# Patient Record
Sex: MALE | Race: WHITE
[De-identification: names, ages, dates, MRNs, and addresses within clinical notes are randomized per-mention and may not be internally consistent; named-entity substitution may affect disease eponyms.]

---

## 2019-12-11 PROBLEM — Z00.00 ENCOUNTER FOR PREVENTIVE HEALTH EXAMINATION: Status: ACTIVE | Noted: 2019-12-11

## 2020-04-02 ENCOUNTER — RESULT REVIEW (OUTPATIENT)
Age: 65
End: 2020-04-02

## 2020-04-21 ENCOUNTER — RESULT REVIEW (OUTPATIENT)
Age: 65
End: 2020-04-21

## 2020-04-26 ENCOUNTER — MESSAGE (OUTPATIENT)
Age: 65
End: 2020-04-26

## 2020-05-22 LAB
SARS-COV-2 IGG SERPL IA-ACNC: 0 INDEX
SARS-COV-2 IGG SERPL QL IA: NEGATIVE

## 2021-01-22 ENCOUNTER — RESULT REVIEW (OUTPATIENT)
Age: 66
End: 2021-01-22

## 2021-12-08 ENCOUNTER — APPOINTMENT (OUTPATIENT)
Dept: GASTROENTEROLOGY | Facility: HOSPITAL | Age: 66
End: 2021-12-08

## 2022-03-07 ENCOUNTER — APPOINTMENT (OUTPATIENT)
Dept: GASTROENTEROLOGY | Facility: CLINIC | Age: 67
End: 2022-03-07

## 2022-04-01 ENCOUNTER — APPOINTMENT (OUTPATIENT)
Dept: GASTROENTEROLOGY | Facility: CLINIC | Age: 67
End: 2022-04-01

## 2022-04-01 NOTE — HISTORY OF PRESENT ILLNESS
[Home] : at home, [unfilled] , at the time of the visit. [Medical Office: ([facility])___] : at the medical office located in  [Verbal consent obtained from patient] : the patient, [unfilled] [de-identified] : -  Colonoscopy 10/2018 and  9/2012 ( screening): Hemorrhoids / diverticulosis

## 2022-04-06 ENCOUNTER — APPOINTMENT (OUTPATIENT)
Dept: GASTROENTEROLOGY | Facility: CLINIC | Age: 67
End: 2022-04-06

## 2022-04-13 ENCOUNTER — APPOINTMENT (OUTPATIENT)
Dept: GASTROENTEROLOGY | Facility: CLINIC | Age: 67
End: 2022-04-13

## 2023-08-09 ENCOUNTER — APPOINTMENT (OUTPATIENT)
Dept: NEUROLOGY | Facility: CLINIC | Age: 68
End: 2023-08-09
Payer: MEDICARE

## 2023-08-09 VITALS
BODY MASS INDEX: 24.01 KG/M2 | HEIGHT: 67 IN | WEIGHT: 153 LBS | OXYGEN SATURATION: 97 % | DIASTOLIC BLOOD PRESSURE: 82 MMHG | SYSTOLIC BLOOD PRESSURE: 137 MMHG | HEART RATE: 75 BPM

## 2023-08-09 DIAGNOSIS — G31.84 MILD COGNITIVE IMPAIRMENT, SO STATED: ICD-10-CM

## 2023-08-09 DIAGNOSIS — R56.9 UNSPECIFIED CONVULSIONS: ICD-10-CM

## 2023-08-09 PROCEDURE — 99215 OFFICE O/P EST HI 40 MIN: CPT

## 2023-08-10 PROBLEM — R56.9 GENERALIZED SEIZURES: Status: ACTIVE | Noted: 2023-08-10

## 2023-08-10 PROBLEM — G31.84 MCI (MILD COGNITIVE IMPAIRMENT) WITH MEMORY LOSS: Status: ACTIVE | Noted: 2023-08-10

## 2023-08-10 NOTE — ASSESSMENT
[FreeTextEntry1] : Thyroid function test Folate and vitamin B12 testing He will need an  MRIHead MRI noncontrast Routine EEG Neuropsychological assessment

## 2023-08-10 NOTE — HISTORY OF PRESENT ILLNESS
[FreeTextEntry1] : First visit of this 68-year-old right-handed male who was brought by his wife for neurologic consultation.  The patient was in the emergency room at [facility] yesterday after he had an event on 8/8/2023  Past medical history significant for hypertension.  The patient is on aspirin 81 mg a day folic acid and lisinopril.  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 was well until 8/8/2023.  He was asleep at around 2 AM in the morning his wife noticed that he had a generalized tonic-clonic seizure.  It lasted 2 minutes or so.  He was postictal for approximately 15 minutes.  He was taken to a local emergency room where he had a basic workup including blood, CT of the brain, EKG etc.  Labs were basically normal.  CT of the head showed no abnormality.  EKG was also within normal range.  The patient reports no warning and no recollection of the events.  In terms of potential etiologies the patient has been taking Ambien 10 mg nightly for 20 years.  He ran out of the medication 5 days before August 8 and had been off the medication for 5 days.  The patient also has a significant history of alcohol use.  He drinks 3-4 drinks a day.  Separate from the above the wife reports that he has been having issues with his memory.  For approximately 2 years he has not been able to drive because he gets confused and lost.  The wife reports that  his memory is not normal. In terms of problems with his memory he does not really do much anymore.  He is at home most of the time.  When I asked him about what he had yesterday or today he did not seem to be able to tell me.

## 2023-08-10 NOTE — PHYSICAL EXAM
[Person] : oriented to person [Place] : oriented to place [Time] : disoriented to time [Span Intact] : the attention span was decreased [Naming Objects] : difficulty naming common objects [Fluency] : fluency intact [Comprehension] : comprehension intact [Current Events] : inadequate knowledge of current events [Cranial Nerves Optic (II)] : visual acuity intact bilaterally,  visual fields full to confrontation, pupils equal round and reactive to light [Cranial Nerves Oculomotor (III)] : extraocular motion intact [Cranial Nerves Trigeminal (V)] : facial sensation intact symmetrically [Cranial Nerves Facial (VII)] : face symmetrical [Cranial Nerves Vestibulocochlear (VIII)] : hearing was intact bilaterally [Cranial Nerves Accessory (XI - Cranial And Spinal)] : head turning and shoulder shrug symmetric [Motor Tone] : muscle tone was normal in all four extremities [Motor Handedness Right-Handed] : the patient is right hand dominant [Abnormal Walk] : normal gait [2+] : Brachioradialis left 2+ [FreeTextEntry4] : Adah 16/30

## 2023-08-11 ENCOUNTER — RESULT REVIEW (OUTPATIENT)
Age: 68
End: 2023-08-11

## 2023-08-28 LAB
FOLATE SERPL-MCNC: 15.8 NG/ML
VIT B12 SERPL-MCNC: 1263 PG/ML

## 2023-09-15 ENCOUNTER — NON-APPOINTMENT (OUTPATIENT)
Age: 68
End: 2023-09-15

## 2023-09-15 ENCOUNTER — APPOINTMENT (OUTPATIENT)
Dept: NEUROLOGY | Facility: CLINIC | Age: 68
End: 2023-09-15
Payer: MEDICARE

## 2023-09-15 PROCEDURE — 95819 EEG AWAKE AND ASLEEP: CPT

## 2023-10-06 ENCOUNTER — RESULT REVIEW (OUTPATIENT)
Age: 68
End: 2023-10-06

## 2023-10-11 ENCOUNTER — APPOINTMENT (OUTPATIENT)
Dept: VASCULAR SURGERY | Facility: CLINIC | Age: 68
End: 2023-10-11

## 2023-10-16 ENCOUNTER — APPOINTMENT (OUTPATIENT)
Dept: NEUROSURGERY | Facility: CLINIC | Age: 68
End: 2023-10-16
Payer: MEDICARE

## 2023-10-16 VITALS
SYSTOLIC BLOOD PRESSURE: 140 MMHG | DIASTOLIC BLOOD PRESSURE: 82 MMHG | WEIGHT: 133 LBS | HEART RATE: 113 BPM | HEIGHT: 67 IN | BODY MASS INDEX: 20.88 KG/M2

## 2023-10-16 PROCEDURE — 99205 OFFICE O/P NEW HI 60 MIN: CPT

## 2023-10-19 ENCOUNTER — RESULT REVIEW (OUTPATIENT)
Age: 68
End: 2023-10-19

## 2023-10-25 ENCOUNTER — APPOINTMENT (OUTPATIENT)
Dept: CARDIOLOGY | Facility: CLINIC | Age: 68
End: 2023-10-25
Payer: MEDICARE

## 2023-10-25 VITALS
OXYGEN SATURATION: 98 % | BODY MASS INDEX: 21.19 KG/M2 | WEIGHT: 135 LBS | RESPIRATION RATE: 18 BRPM | HEART RATE: 98 BPM | TEMPERATURE: 97.6 F | DIASTOLIC BLOOD PRESSURE: 80 MMHG | HEIGHT: 67 IN | SYSTOLIC BLOOD PRESSURE: 150 MMHG

## 2023-10-25 DIAGNOSIS — I10 ESSENTIAL (PRIMARY) HYPERTENSION: ICD-10-CM

## 2023-10-25 DIAGNOSIS — Z78.9 OTHER SPECIFIED HEALTH STATUS: ICD-10-CM

## 2023-10-25 DIAGNOSIS — Z01.810 ENCOUNTER FOR PREPROCEDURAL CARDIOVASCULAR EXAMINATION: ICD-10-CM

## 2023-10-25 DIAGNOSIS — R91.1 SOLITARY PULMONARY NODULE: ICD-10-CM

## 2023-10-25 DIAGNOSIS — Z87.19 PERSONAL HISTORY OF OTHER DISEASES OF THE DIGESTIVE SYSTEM: ICD-10-CM

## 2023-10-25 DIAGNOSIS — Z87.891 PERSONAL HISTORY OF NICOTINE DEPENDENCE: ICD-10-CM

## 2023-10-25 PROCEDURE — 99204 OFFICE O/P NEW MOD 45 MIN: CPT

## 2023-10-25 RX ORDER — ZOLPIDEM TARTRATE 10 MG/1
10 TABLET, FILM COATED ORAL
Refills: 0 | Status: ACTIVE | COMMUNITY

## 2023-10-25 RX ORDER — LISINOPRIL 10 MG/1
10 TABLET ORAL DAILY
Qty: 90 | Refills: 3 | Status: ACTIVE | COMMUNITY

## 2023-10-25 RX ORDER — ASPIRIN 81 MG
81 TABLET, DELAYED RELEASE (ENTERIC COATED) ORAL DAILY
Refills: 0 | Status: ACTIVE | COMMUNITY

## 2023-10-31 ENCOUNTER — NON-APPOINTMENT (OUTPATIENT)
Age: 68
End: 2023-10-31

## 2023-11-16 ENCOUNTER — APPOINTMENT (OUTPATIENT)
Dept: NEUROSURGERY | Facility: HOSPITAL | Age: 68
End: 2023-11-16

## 2023-11-16 ENCOUNTER — RESULT REVIEW (OUTPATIENT)
Age: 68
End: 2023-11-16

## 2023-11-17 ENCOUNTER — TRANSCRIPTION ENCOUNTER (OUTPATIENT)
Age: 68
End: 2023-11-17

## 2023-11-29 ENCOUNTER — APPOINTMENT (OUTPATIENT)
Dept: NEUROSURGERY | Facility: CLINIC | Age: 68
End: 2023-11-29
Payer: MEDICARE

## 2023-11-29 VITALS
OXYGEN SATURATION: 96 % | HEIGHT: 67 IN | HEART RATE: 95 BPM | WEIGHT: 135 LBS | SYSTOLIC BLOOD PRESSURE: 120 MMHG | BODY MASS INDEX: 21.19 KG/M2 | DIASTOLIC BLOOD PRESSURE: 80 MMHG

## 2023-11-29 PROCEDURE — 99024 POSTOP FOLLOW-UP VISIT: CPT

## 2023-12-12 ENCOUNTER — APPOINTMENT (OUTPATIENT)
Dept: PAIN MANAGEMENT | Facility: CLINIC | Age: 68
End: 2023-12-12
Payer: MEDICARE

## 2023-12-12 VITALS
OXYGEN SATURATION: 99 % | WEIGHT: 135 LBS | SYSTOLIC BLOOD PRESSURE: 119 MMHG | HEIGHT: 67 IN | BODY MASS INDEX: 21.19 KG/M2 | DIASTOLIC BLOOD PRESSURE: 75 MMHG | HEART RATE: 79 BPM

## 2023-12-12 DIAGNOSIS — M96.1 POSTLAMINECTOMY SYNDROME, NOT ELSEWHERE CLASSIFIED: ICD-10-CM

## 2023-12-12 DIAGNOSIS — M54.16 RADICULOPATHY, LUMBAR REGION: ICD-10-CM

## 2023-12-12 PROCEDURE — 99203 OFFICE O/P NEW LOW 30 MIN: CPT

## 2023-12-22 ENCOUNTER — APPOINTMENT (OUTPATIENT)
Dept: PULMONOLOGY | Facility: CLINIC | Age: 68
End: 2023-12-22
Payer: MEDICARE

## 2023-12-22 VITALS
HEART RATE: 85 BPM | DIASTOLIC BLOOD PRESSURE: 70 MMHG | BODY MASS INDEX: 21.19 KG/M2 | WEIGHT: 135 LBS | HEIGHT: 67 IN | SYSTOLIC BLOOD PRESSURE: 120 MMHG | OXYGEN SATURATION: 98 %

## 2023-12-22 DIAGNOSIS — R91.1 SOLITARY PULMONARY NODULE: ICD-10-CM

## 2023-12-22 PROCEDURE — 99204 OFFICE O/P NEW MOD 45 MIN: CPT

## 2023-12-22 NOTE — ASSESSMENT
[FreeTextEntry1] : Patient with 8.3 mm left lower lobe nodule.  There are no old films to compare with.  Patient will be referred for a CAT scan of the chest after January 6 which will represent a 3-month follow-up.  Note is made that the patient has lost 30 pounds over the past 6 months.  He does have a history of alcoholic cirrhosis.  I will discuss with patient after the studies performed.

## 2023-12-22 NOTE — HISTORY OF PRESENT ILLNESS
[TextBox_4] : 68-year-old male former less than 1 pack/day smoker but quit 30 years ago.  He recently was undergoing evaluation for leg pain and underwent a CTA of the abdomen.  This reveals an 8.3 mm left lower lobe pulmonary nodule.  There are no old films to compare with his chest x-ray is normal.  He denies chest pain shortness of breath cough or wheezing.  Past medical history significant for hypertension, alcoholic cirrhosis, right sciatica with numbness of the toes bilaterally.  He is status post lumbar fusion on November 16.  Of note the patient has lost 30 pounds over the past 6 months of unclear reason.  He simply has no appetite.  He is a former alcohol abuser.  In November.  He now comes in for evaluation of the 8.3 mm left lower lobe nodule.

## 2023-12-26 LAB
AFP-TM SERPL-MCNC: 2.1 NG/ML
ALBUMIN SERPL ELPH-MCNC: 4.6 G/DL
ALP BLD-CCNC: 102 U/L
ALT SERPL-CCNC: 41 U/L
ANA PAT FLD IF-IMP: ABNORMAL
ANA SER IF-ACNC: ABNORMAL
ANION GAP SERPL CALC-SCNC: 13 MMOL/L
AST SERPL-CCNC: 34 U/L
BASOPHILS # BLD AUTO: 0.08 K/UL
BASOPHILS NFR BLD AUTO: 0.8 %
BILIRUB DIRECT SERPL-MCNC: 0.2 MG/DL
BILIRUB INDIRECT SERPL-MCNC: 0.2 MG/DL
BILIRUB SERPL-MCNC: 0.4 MG/DL
BUN SERPL-MCNC: 10 MG/DL
CALCIUM SERPL-MCNC: 9.8 MG/DL
CERULOPLASMIN SERPL-MCNC: 27 MG/DL
CHLORIDE SERPL-SCNC: 102 MMOL/L
CO2 SERPL-SCNC: 25 MMOL/L
CREAT SERPL-MCNC: 0.8 MG/DL
EGFR: 98 ML/MIN/1.73M2
ENDOMYSIUM IGA SER QL: NEGATIVE
ENDOMYSIUM IGA TITR SER: NORMAL
EOSINOPHIL # BLD AUTO: 0.15 K/UL
EOSINOPHIL NFR BLD AUTO: 1.5 %
FERRITIN SERPL-MCNC: 570 NG/ML
GLIADIN IGA SER QL: 5.8 UNITS
GLIADIN IGG SER QL: <5 UNITS
GLIADIN PEPTIDE IGA SER-ACNC: NEGATIVE
GLIADIN PEPTIDE IGG SER-ACNC: NEGATIVE
GLUCOSE SERPL-MCNC: 93 MG/DL
HBV CORE IGG+IGM SER QL: NONREACTIVE
HBV CORE IGM SER QL: NONREACTIVE
HBV E AG SER QL: NONREACTIVE
HBV SURFACE AB SER QL: REACTIVE
HBV SURFACE AG SER QL: NONREACTIVE
HCT VFR BLD CALC: 40.8 %
HCV AB SER QL: NONREACTIVE
HCV S/CO RATIO: 0.13 S/CO
HEPATITIS A IGG ANTIBODY: NONREACTIVE
HGB BLD-MCNC: 13.2 G/DL
IMM GRANULOCYTES NFR BLD AUTO: 0.5 %
INR PPP: 1.19 RATIO
IRON SATN MFR SERPL: 17 %
IRON SERPL-MCNC: 56 UG/DL
LYMPHOCYTES # BLD AUTO: 1.51 K/UL
LYMPHOCYTES NFR BLD AUTO: 15.3 %
MAN DIFF?: NORMAL
MCHC RBC-ENTMCNC: 31.2 PG
MCHC RBC-ENTMCNC: 32.4 GM/DL
MCV RBC AUTO: 96.5 FL
MITOCHONDRIA AB SER IF-ACNC: NORMAL
MONOCYTES # BLD AUTO: 0.69 K/UL
MONOCYTES NFR BLD AUTO: 7 %
NEUTROPHILS # BLD AUTO: 7.37 K/UL
NEUTROPHILS NFR BLD AUTO: 74.9 %
PLATELET # BLD AUTO: 180 K/UL
POTASSIUM SERPL-SCNC: 4.7 MMOL/L
PROT SERPL-MCNC: 7.3 G/DL
PT BLD: 13.4 SEC
RBC # BLD: 4.23 M/UL
RBC # FLD: 13 %
SMOOTH MUSCLE AB SER QL IF: NORMAL
SODIUM SERPL-SCNC: 140 MMOL/L
STRIA MUS AB SER QL: NORMAL
TIBC SERPL-MCNC: 330 UG/DL
TTG IGA SER IA-ACNC: <1.2 U/ML
TTG IGA SER-ACNC: NEGATIVE
TTG IGG SER IA-ACNC: 5.3 U/ML
TTG IGG SER IA-ACNC: NEGATIVE
UIBC SERPL-MCNC: 274 UG/DL
WBC # FLD AUTO: 9.85 K/UL

## 2023-12-27 ENCOUNTER — APPOINTMENT (OUTPATIENT)
Dept: NEUROSURGERY | Facility: HOSPITAL | Age: 68
End: 2023-12-27

## 2023-12-27 LAB
DEPRECATED KAPPA LC FREE/LAMBDA SER: 2.31 RATIO
IGA SER QL IEP: 307 MG/DL
IGA SER QL IEP: 307 MG/DL
IGG SER QL IEP: 1139 MG/DL
IGM SER QL IEP: 121 MG/DL
KAPPA LC CSF-MCNC: 1.04 MG/DL
KAPPA LC SERPL-MCNC: 2.4 MG/DL
LKM AB SER QL IF: <20.1 UNITS

## 2023-12-28 LAB
FIBROSIS SCORE: 0.9
FIBROSIS STAGE: NORMAL
FIBROSURE ALPHA 2 MACROGLOBULINS: 432 MG/DL
FIBROSURE ALT (SGPT): 43 IU/L
FIBROSURE APOLIPOPROTEIN A1: 112 MG/DL
FIBROSURE COMMENT 2: NORMAL
FIBROSURE GGT: 413 IU/L
FIBROSURE HAPTOGLOBIN: 120 MG/DL
FIBROSURE INTERPRETATIONS: NORMAL
FIBROSURE LIMITATIONS: NORMAL
FIBROSURE NECROINFLAMM ACTIVITY SCORING: NORMAL
FIBROSURE NECROINFLAMMAT ACTIVITY GRPFIBROSURE NECROINFLAMMA: NORMAL
FIBROSURE NECROINFLAMMAT ACTIVITY SCORE: 0.44
FIBROSURE SCORING: NORMAL
FIBROSURE TOTAL BILIRUBIN: 0.4 MG/DL
SOLUBLE LIVER IGG SER IA-ACNC: 0.9

## 2023-12-29 ENCOUNTER — TRANSCRIPTION ENCOUNTER (OUTPATIENT)
Age: 68
End: 2023-12-29

## 2024-01-02 LAB
A1AT PHENOTYP SERPL-IMP: NORMAL
A1AT SERPL-MCNC: 166 MG/DL

## 2024-01-10 ENCOUNTER — RESULT REVIEW (OUTPATIENT)
Age: 69
End: 2024-01-10

## 2024-01-11 ENCOUNTER — APPOINTMENT (OUTPATIENT)
Dept: GASTROENTEROLOGY | Facility: CLINIC | Age: 69
End: 2024-01-11
Payer: SELF-PAY

## 2024-01-11 VITALS
BODY MASS INDEX: 20.4 KG/M2 | DIASTOLIC BLOOD PRESSURE: 60 MMHG | WEIGHT: 130 LBS | HEIGHT: 67 IN | SYSTOLIC BLOOD PRESSURE: 110 MMHG

## 2024-01-11 DIAGNOSIS — K21.9 GASTRO-ESOPHAGEAL REFLUX DISEASE W/OUT ESOPHAGITIS: ICD-10-CM

## 2024-01-11 DIAGNOSIS — K70.30 ALCOHOLIC CIRRHOSIS OF LIVER W/OUT ASCITES: ICD-10-CM

## 2024-01-11 DIAGNOSIS — Z12.11 ENCOUNTER FOR SCREENING FOR MALIGNANT NEOPLASM OF COLON: ICD-10-CM

## 2024-01-11 DIAGNOSIS — R63.4 ABNORMAL WEIGHT LOSS: ICD-10-CM

## 2024-01-11 PROCEDURE — G2211 COMPLEX E/M VISIT ADD ON: CPT

## 2024-01-11 PROCEDURE — 99205 OFFICE O/P NEW HI 60 MIN: CPT

## 2024-01-11 RX ORDER — GABAPENTIN 400 MG/1
400 CAPSULE ORAL
Qty: 60 | Refills: 1 | Status: DISCONTINUED | COMMUNITY
Start: 2023-12-12 | End: 2024-01-11

## 2024-01-11 RX ORDER — OXYCODONE AND ACETAMINOPHEN 5; 325 MG/1; MG/1
5-325 TABLET ORAL
Qty: 30 | Refills: 0 | Status: DISCONTINUED | COMMUNITY
Start: 2023-12-22 | End: 2024-01-11

## 2024-01-11 RX ORDER — METHYLPREDNISOLONE 4 MG/1
4 TABLET ORAL
Qty: 1 | Refills: 0 | Status: DISCONTINUED | COMMUNITY
Start: 2023-12-05 | End: 2024-01-11

## 2024-01-11 NOTE — ASSESSMENT
[FreeTextEntry1] : 1.  Compensated cirrhosis likely due to ETOH.  Complete abstinence recommended. EGD to exclude varices  2. Colon cancer screening:  colonoscopy scheduled  3. Weight loss: possibly related to  cirrhosis. Awaiting MRI / EGD / colonoscopy  Will review MRI when available Office and labs every 6 months  Pertinent available records reviewed Risks of the procedures including but not limited to bleeding / perforation / infection / anesthesia complication / missed  lesions explained to the  patient . The patient expressed understanding and a desire to proceed with the procedures.  Risk of not doing procedures includes but is not limited to missed or delayed diagnosis of gastric pathology, colon cancer or other gastrointestinal pathology A consultation note was provided to the referring provider The patient is at increased risk of anesthesia / procedure related complications  secondary to cirrhosis

## 2024-01-11 NOTE — PHYSICAL EXAM
[General Appearance - Alert] : alert [Sclera] : the sclera and conjunctiva were normal [Outer Ear] : the ears and nose were normal in appearance [] : no respiratory distress [Bowel Sounds] : normal bowel sounds [Abdomen Soft] : soft [Skin Color & Pigmentation] : normal skin color and pigmentation [No Focal Deficits] : no focal deficits [Oriented To Time, Place, And Person] : oriented to person, place, and time [FreeTextEntry1] : ambulates with cane

## 2024-01-11 NOTE — HISTORY OF PRESENT ILLNESS
[de-identified] : MIKE HOFFMANN  is being evaluated at the request of Dr. Cardoza for an opinion re: cirrhosis of the liver. Cirrhotic appearing liver and hepatic nodule noted during  imaging for back.   Admits to 1/2 gallon of ETOH daily for ~  30 years.  Last drink was Oct 31 2023  Denies nausea, vomiting, fever, chills, melena, hematemesis, BRBPR. Admits to weight loss and early satiety.     Labs 12/22/2023: normal AFP / normal CBC / normal BMET and LFTS / Hep B immune / Hep A and C negative / unremarkable metabolic and autoimmune markers of liver disease / fibroscore = 0.90 c/w cirrhosis  / INR = 1.19. Requested MRI not yet resulted    -  Colonoscopy 10/2018 and  9/2012 ( screening): Hemorrhoids / diverticulosis

## 2024-01-19 ENCOUNTER — APPOINTMENT (OUTPATIENT)
Dept: NEUROSURGERY | Facility: CLINIC | Age: 69
End: 2024-01-19
Payer: MEDICARE

## 2024-01-19 VITALS
SYSTOLIC BLOOD PRESSURE: 112 MMHG | OXYGEN SATURATION: 99 % | HEART RATE: 115 BPM | HEIGHT: 67 IN | DIASTOLIC BLOOD PRESSURE: 77 MMHG | WEIGHT: 133 LBS | BODY MASS INDEX: 20.88 KG/M2

## 2024-01-19 DIAGNOSIS — G89.18 OTHER ACUTE POSTPROCEDURAL PAIN: ICD-10-CM

## 2024-01-19 PROCEDURE — 99024 POSTOP FOLLOW-UP VISIT: CPT

## 2024-01-19 RX ORDER — METHOCARBAMOL 500 MG/1
500 TABLET, FILM COATED ORAL
Qty: 60 | Refills: 0 | Status: ACTIVE | COMMUNITY
Start: 2024-01-19 | End: 1900-01-01

## 2024-01-19 NOTE — HISTORY OF PRESENT ILLNESS
[FreeTextEntry1] : Mr. Combs presents s/p L5-S1 transforaminal lumbar interbody fusion on 12/27/23 for recurrent lumbar disc herniation with intractable bilateral radiculopathy (right greater than left) and back pain that did not improve with conservative measures.  He is doing well post procedure and has had improvement in his back and radicular pain. He does however have significant muscular discomfort that goes across his low back and up past his inciison. He did not  his prescriptions of methocarbamol or Oxycodone after hospital discharge but has been taking over the counter medications with some relief. His wife has also been applying a TENS unit which is helping him. He underwent L5/S1 microdiscectomy on 11/16/23 with my partner Dr. Dennis, but returned to the emergency room with recurrent and severe symptoms. He reports the numbness in his feet that inhibited his ability to walk is unchanged, however he is ambulating well in the office and has grossly intact sensation in both feet. The patient denies new pain, numbness, tingling, weakness, bowel/bladder dysfunction, gait disturbance, fever, chills, drainage from incision, redness at incision site.  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

## 2024-01-19 NOTE — DATA REVIEWED
[de-identified] :  ______________________________________________________________________________________ 						 Exam Date: 	  12/28/23					 Exam: 	XR L S SPINE AP LAT					 Order#:	[id number]					                CLINICAL INDICATION: follow-up lumbar spine surgery  EXAM: Upright AP lateral coned-down lateral lumbosacral spine from 12/28/2023 at 0930. Compared to previous day chest fluoroscopic spot images.   IMPRESSION: Stable intact previously seen L5-S1 level posterior spinal fusion hardware consisting of screws with interconnecting rods and metallic interbody disc spacer implant at respective disc level.  Vertebral body and disc space heights and alignment maintained.  Redemonstrated multilevel variable thickness anterior disc margin osteophytes/ligamentous ossifications.  Unremarkable SI joints and pubic symphysis. Partially visualized degenerative rim ossifications along peripheral acetabular articular margins.

## 2024-01-19 NOTE — ASSESSMENT
[FreeTextEntry1] : Mr. Combs has been doing well since his L5-S1 transforaminal lumbar interbody fusion on 12/27/23.  He was instructed to continue using fragrance-free shampoo to clean incision twice daily. We resent a prescription for Methocarbamol BID as needed, as stressed the benefit of muscle relaxants during the healing process at this time. I have provided a prescription for outpatient PT.  He may restart his home ASA at this point from the neurosurgical standpoint. He should return to the office in six weeks time for a progress check with x-rays prior to visit.  The patient understands the plan of care and is in agreement with it.  I spent 45 minutes relative to this patient encounter.

## 2024-01-19 NOTE — PHYSICAL EXAM
[Longitudinal] : longitudinal [Clean] : clean [Dry] : dry [Healing Well] : healing well [Intact] : intact [Cranial Nerves Optic (II)] : visual acuity intact bilaterally,  pupils equal round and reactive to light [Cranial Nerves Oculomotor (III)] : extraocular motion intact [Cranial Nerves Trigeminal (V)] : facial sensation intact symmetrically [Cranial Nerves Facial (VII)] : face symmetrical [Cranial Nerves Vestibulocochlear (VIII)] : hearing was intact bilaterally [Cranial Nerves Glossopharyngeal (IX)] : tongue and palate midline [Cranial Nerves Accessory (XI - Cranial And Spinal)] : head turning and shoulder shrug symmetric [Cranial Nerves Hypoglossal (XII)] : there was no tongue deviation with protrusion [Motor Strength] : muscle strength was normal in all four extremities [Sensation Tactile Decrease] : light touch was intact [Abnormal Walk] : normal gait [FreeTextEntry1] : low back

## 2024-03-01 ENCOUNTER — NON-APPOINTMENT (OUTPATIENT)
Age: 69
End: 2024-03-01

## 2024-03-02 ENCOUNTER — RESULT REVIEW (OUTPATIENT)
Age: 69
End: 2024-03-02

## 2024-03-08 ENCOUNTER — APPOINTMENT (OUTPATIENT)
Dept: NEUROSURGERY | Facility: CLINIC | Age: 69
End: 2024-03-08
Payer: MEDICARE

## 2024-03-08 DIAGNOSIS — R20.0 ANESTHESIA OF SKIN: ICD-10-CM

## 2024-03-08 DIAGNOSIS — M51.16 INTERVERTEBRAL DISC DISORDERS WITH RADICULOPATHY, LUMBAR REGION: ICD-10-CM

## 2024-03-08 DIAGNOSIS — I73.9 PERIPHERAL VASCULAR DISEASE, UNSPECIFIED: ICD-10-CM

## 2024-03-08 DIAGNOSIS — R20.2 ANESTHESIA OF SKIN: ICD-10-CM

## 2024-03-08 DIAGNOSIS — Z98.1 ARTHRODESIS STATUS: ICD-10-CM

## 2024-03-08 PROCEDURE — 99024 POSTOP FOLLOW-UP VISIT: CPT

## 2024-03-08 NOTE — HISTORY OF PRESENT ILLNESS
[FreeTextEntry1] : Mr. Combs presents s/p L5-S1 transforaminal lumbar interbody fusion on 12/27/23. He is doing well post op and his good relief from his back pain and radiculopathy. He has restarted his home Aspirin. He has been participating with PT. He underwent repeat x-rays prior to his appointmen today. He was hospitalized this week with petechial rash and saw hematology with lupus anticoagulant panel and other labs pending. He also reports numbness in both feet in the toes for three weeks. Denies shooting radiating pain. The numbness and cold sensation is causing difficulty walking. He has an appointment to see his PCP and podiatrist. Denies tick bites or history of lyme disease.  The patient denies new weakness, bowel/bladder dysfunction, gait disturbance, fever, chills, drainage from incision, redness at incision site.  1/19/24: Mr. Combs presents s/p L5-S1 transforaminal lumbar interbody fusion on 12/27/23 for recurrent lumbar disc herniation with intractable bilateral radiculopathy (right greater than left) and back pain that did not improve with conservative measures.  He is doing well post procedure and has had improvement in his back and radicular pain. He does however have significant muscular discomfort that goes across his low back and up past his inciison. He did not  his prescriptions of methocarbamol or Oxycodone after hospital discharge but has been taking over the counter medications with some relief. His wife has also been applying a TENS unit which is helping him. He underwent L5/S1 microdiscectomy on 11/16/23 with my partner Dr. Dennis, but returned to the emergency room with recurrent and severe symptoms. He reports the numbness in his feet that inhibited his ability to walk is unchanged, however he is ambulating well in the office and has grossly intact sensation in both feet. The patient denies new pain, numbness, tingling, weakness, bowel/bladder dysfunction, gait disturbance, fever, chills, drainage from incision, redness at incision site.  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

## 2024-03-08 NOTE — ASSESSMENT
[FreeTextEntry1] : Mr. Combs has been doing well since his L5-S1 transforaminal lumbar interbody fusion on 12/27/23.  He was instructed to continue using fragrance-free shampoo to clean incision twice daily. We reviewed his repeat x-rays today in the office which show no significant interval change and well maintained lumbar alignment.  He has made a good recovery from the standpoint of his lumbar disease, however his new symptoms of bilateral upper extremity rash and bilateral lower extremity distal neuropathic pain is something that requires further investigation.  Given the fact that he has no back pain or new weakness/radiculopathy, this does not appear to be originating in the lumbar spine or around the operative site.  Given his history of peripheral artery disease, I recommend he continue his prescribed daily ASA, and obtain a bilateral lower extremity venous and arterial Doppler. I also recommend that he sees neurology to evaluate for any peripheral neuropathy. His PCP has referred him to podiatry as well. He has a new petechial rash to his arms that is being worked up with hematology.  He should return to the office in three months time for a progress check. The patient understands the plan of care and is in agreement with it.  I spent 45 minutes relative to this patient encounter.

## 2024-03-08 NOTE — DATA REVIEWED
[de-identified] : Exam Date:      03/02/24 Exam:         XR L S SPINE AP LAT Order#:       [id number]    CLINICAL HISTORY: Lumbar disc disease   Lumbosacral spine (comparison 12/28/2023   Frontal, lateral and cone-down projections demonstrate satisfactory alignment of the bony structures. The patient is status post hardware fusion at the L5-S1 level. A spacer is present within the L5-S1 disc space. No fracture or vertebral compression can be appreciated. There is no evidence of pedicle destruction. There is very mild disc space narrowing at the L1-2 and L2-3 levels. 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 The sacroiliac joints are symmetric.   IMPRESSION: Status post L5-S1 fusion. No significant interval change. ______________________________________________________________________________________ 						 Exam Date: 	  12/28/23					 Exam: 	XR L S SPINE AP LAT					 Order#:	[id number]					                CLINICAL INDICATION: follow-up lumbar spine surgery  EXAM: Upright AP lateral coned-down lateral lumbosacral spine from 12/28/2023 at 0930. Compared to previous day chest fluoroscopic spot images.   IMPRESSION: Stable intact previously seen L5-S1 level posterior spinal fusion hardware consisting of screws with interconnecting rods and metallic interbody disc spacer implant at respective disc level.  Vertebral body and disc space heights and alignment maintained.  Redemonstrated multilevel variable thickness anterior disc margin osteophytes/ligamentous ossifications.  Unremarkable SI joints and pubic symphysis. Partially visualized degenerative rim ossifications along peripheral acetabular articular margins.

## 2024-03-14 ENCOUNTER — APPOINTMENT (OUTPATIENT)
Dept: PODIATRY | Facility: CLINIC | Age: 69
End: 2024-03-14

## 2024-03-14 ENCOUNTER — RESULT REVIEW (OUTPATIENT)
Age: 69
End: 2024-03-14

## 2024-03-18 ENCOUNTER — APPOINTMENT (OUTPATIENT)
Dept: HEMATOLOGY ONCOLOGY | Facility: CLINIC | Age: 69
End: 2024-03-18

## 2024-04-06 ENCOUNTER — LABORATORY RESULT (OUTPATIENT)
Age: 69
End: 2024-04-06

## 2024-04-06 ENCOUNTER — APPOINTMENT (OUTPATIENT)
Dept: PODIATRY | Facility: CLINIC | Age: 69
End: 2024-04-06
Payer: MEDICARE

## 2024-04-06 VITALS
BODY MASS INDEX: 20.88 KG/M2 | SYSTOLIC BLOOD PRESSURE: 129 MMHG | WEIGHT: 133 LBS | DIASTOLIC BLOOD PRESSURE: 79 MMHG | HEIGHT: 67 IN

## 2024-04-06 PROCEDURE — 99204 OFFICE O/P NEW MOD 45 MIN: CPT

## 2024-04-06 NOTE — ASSESSMENT
[Verbal] : verbal [Handout] : handout [Patient] : patient [Spouse] : spouse [Good - alert, interested, motivated] : Good - alert, interested, motivated [Foot Care] : foot care [Arterial Disease] : arterial disease [Labs and Tests] : labs and tests [Other: ____] : [unfilled]

## 2024-04-06 NOTE — PHYSICAL EXAM
[General Appearance - Alert] : alert [General Appearance - In No Acute Distress] : in no acute distress [FreeTextEntry3] : Vascular exam reveals palpable pedal pulses, the foot is warm to touch, there was good capillary fill time, the skin is normal in appearance there is no evidence of vascular disease or compromise at this time [No Joint Swelling] : no joint swelling [Normal Foot/Ankle] : Both lower extremities were exposed and visualized. Standing exam demonstrates normal foot posture and alignment. Hindfoot exam shows no hindfoot valgus or varus [Skin Color & Pigmentation] : normal skin color and pigmentation [Skin Turgor] : normal skin turgor [] : no rash [Skin Lesions] : no skin lesions [Foot Ulcer] : no foot ulcer [Skin Induration] : no skin induration [Vibration Dec.] : diminished vibratory sensation at the level of the toes [Position Sense Dec.] : normal position sense at the level of the toes [FreeTextEntry4] : Along the L5 and S1 nerve root distally [FreeTextEntry8] : Along the L4-L5 nerve root distally [FreeTextEntry1] : See above description [Oriented To Time, Place, And Person] : oriented to person, place, and time [Impaired Insight] : insight and judgment were intact [Affect] : the affect was normal

## 2024-04-06 NOTE — HISTORY OF PRESENT ILLNESS
[FreeTextEntry1] : Location: Lateral aspect of right foot medial aspect of left foot Duration: About a year Etiology: Alcohol abuse, low back pathology with multiple surgeries Past Tx: None

## 2024-04-06 NOTE — PROCEDURE
[FreeTextEntry1] : Based on my physical examination and my clinical findings and the patient's description of the symptoms, a complete differential diagnosis was reviewed with the patient. Possible diagnoses as well as treatment options explained in great detail. All questions asked and answered appropriately.  This included contributing factors of L4, L5, and S1 nerve root pathologies as they affect the medial, mid, and lateral digital nerve root dermatomes.  Also within the differential is peripheral neuropathy secondary to alcohol abuse in the past.  Patient states he no longer consumes alcohol.  Also within the differential is a B12 deficiency. I had a long discussion with the patient regarding etiology and treatment options for peripheral neuropathy. I discussed very conservative options of over-the-counter vitamins and increased to a more advanced treatment option of oral medications which have been known to control peripheral neuropathy. The patient was advised to begin over-the-counter vitamins B complex with vitamins C., and I did explain that if the over-the-counter remedies do not control the symptoms that we could advance to prescription medication. I had a lengthy d/w the patient re: the use and benefits of various vitamins as a dietary supplement which can help treat their current condition. Educational literature supplied and all questions asked and answered appropriately. Suggestions of the type of vitamins recommended dispensed and advised to use on a consistent basis  [Patient] : the patient [Instructions Given] : given handouts/patient instructions

## 2024-05-09 ENCOUNTER — APPOINTMENT (OUTPATIENT)
Dept: NEUROLOGY | Facility: CLINIC | Age: 69
End: 2024-05-09
Payer: MEDICARE

## 2024-05-09 VITALS
HEIGHT: 67 IN | DIASTOLIC BLOOD PRESSURE: 78 MMHG | OXYGEN SATURATION: 98 % | SYSTOLIC BLOOD PRESSURE: 131 MMHG | BODY MASS INDEX: 24.01 KG/M2 | WEIGHT: 153 LBS | HEART RATE: 97 BPM

## 2024-05-09 DIAGNOSIS — G62.9 POLYNEUROPATHY, UNSPECIFIED: ICD-10-CM

## 2024-05-09 DIAGNOSIS — G62.1 ALCOHOLIC POLYNEUROPATHY: ICD-10-CM

## 2024-05-09 PROCEDURE — 99204 OFFICE O/P NEW MOD 45 MIN: CPT

## 2024-05-09 PROCEDURE — 99214 OFFICE O/P EST MOD 30 MIN: CPT

## 2024-05-09 RX ORDER — GABAPENTIN 300 MG/1
300 CAPSULE ORAL
Qty: 60 | Refills: 3 | Status: ACTIVE | COMMUNITY
Start: 2024-05-09 | End: 1900-01-01

## 2024-05-09 NOTE — ASSESSMENT
[FreeTextEntry1] : Neurologic examination is consistent with a peripheral neuropathy.  I will schedule him for electrodiagnostic studies to see the extent of the damage.  The amount of discomfort does not correlate with the amount of damage.  He is agreeable to starting gabapentin.  He will take 300 mg at bedtime with increase to 300 mg twice daily.  Further recommendations will be made after EMG/NCV is complete.

## 2024-05-09 NOTE — PHYSICAL EXAM
[FreeTextEntry1] : Physical examination  General: No acute distress, Awake, Alert.   Mental status  Awake, alert, and oriented to person, time and place, Normal attention span and concentration, Recent and remote memory intact, Language intact, Fund of knowledge intact.  Cranial Nerves  II: VFF  III, IV, VI: PERRL, EOMI.  V: Facial sensation is normal B/L.  VII: Facial strength is normal B/L.  VIII: Gross hearing is intact.  IX, X: Palate is midline and elevates symmetrically.  XI: Trapezius normal strength.  XII: Tongue midline without atrophy or fasciculations.   Motor exam  Muscle tone - no evidence of rigidity or resistance in all 4 extremities.  No atrophy or fasciculations  Muscle Strength: arms and legs, proximal and distal flexors and extensors are normal  No UE drift.  Reflexes  All present, normal, and symmetrical.  Plantars right: mute.  Plantars left: mute.  Absent ankle jerks.   Coordination  Finger to nose: Normal.  Heel to shin: Normal.   Sensory  Decreased pinprick up to the middle of the legs anteriorly.  Vibration is impaired.  Romberg is present.  Gait  Wide-based and slightly unsteady.

## 2024-05-09 NOTE — CONSULT LETTER
[Dear  ___] : Dear  [unfilled], [Consult Letter:] : I had the pleasure of evaluating your patient, [unfilled]. [Please see my note below.] : Please see my note below. [FreeTextEntry3] : Sincerely,  Myesha Harvey M.D.

## 2024-05-09 NOTE — REASON FOR VISIT
[Consultation] : a consultation visit [FreeTextEntry1] : Patient came today because he has numbness, tingling and pain in both feet months ago.

## 2024-05-09 NOTE — HISTORY OF PRESENT ILLNESS
[FreeTextEntry1] : This is a 69-year-old man who is being seen in neurologic consultation for evaluation of numbness and tingling in his feet.  Patient states it has been present for many years but has been recently getting worse.  He recalls that initially started in the right fourth and fifth toes.  It now spread to the left side.  He describes a significant burning which is present all day.  He describes the pain as a deep burning.  He rates it as a 9/10 on his personal pain scale.  He states that he does not feel any loss of strength.  Balance is okay.  He does have a history of significant alcohol use in the past.  He states he has not drinking any longer. He also carries a diagnosis of peripheral arterial disease.   A recent B12 is normal.

## 2024-05-13 ENCOUNTER — APPOINTMENT (OUTPATIENT)
Dept: HEMATOLOGY ONCOLOGY | Facility: CLINIC | Age: 69
End: 2024-05-13

## 2024-05-15 ENCOUNTER — APPOINTMENT (OUTPATIENT)
Dept: PODIATRY | Facility: CLINIC | Age: 69
End: 2024-05-15

## 2024-06-09 ENCOUNTER — RESULT REVIEW (OUTPATIENT)
Age: 69
End: 2024-06-09

## 2024-06-10 ENCOUNTER — APPOINTMENT (OUTPATIENT)
Dept: GASTROENTEROLOGY | Facility: HOSPITAL | Age: 69
End: 2024-06-10

## 2024-06-10 ENCOUNTER — TRANSCRIPTION ENCOUNTER (OUTPATIENT)
Age: 69
End: 2024-06-10

## 2024-06-10 DIAGNOSIS — I85.00 ESOPHAGEAL VARICES W/OUT BLEEDING: ICD-10-CM

## 2024-06-10 RX ORDER — PANTOPRAZOLE 40 MG/1
40 TABLET, DELAYED RELEASE ORAL
Qty: 90 | Refills: 3 | Status: ACTIVE | COMMUNITY
Start: 2024-06-10 | End: 1900-01-01

## 2024-06-10 RX ORDER — PROPRANOLOL HYDROCHLORIDE 10 MG/1
10 TABLET ORAL
Qty: 180 | Refills: 3 | Status: ACTIVE | COMMUNITY
Start: 2024-06-10 | End: 1900-01-01

## 2024-06-27 ENCOUNTER — NON-APPOINTMENT (OUTPATIENT)
Age: 69
End: 2024-06-27

## 2024-07-05 DIAGNOSIS — K21.9 GASTRO-ESOPHAGEAL REFLUX DISEASE W/OUT ESOPHAGITIS: ICD-10-CM

## 2024-07-05 RX ORDER — PANTOPRAZOLE 40 MG/1
40 TABLET, DELAYED RELEASE ORAL
Qty: 90 | Refills: 2 | Status: ACTIVE | COMMUNITY
Start: 2024-07-05 | End: 1900-01-01

## 2024-07-08 ENCOUNTER — NON-APPOINTMENT (OUTPATIENT)
Age: 69
End: 2024-07-08

## 2024-07-09 ENCOUNTER — APPOINTMENT (OUTPATIENT)
Dept: NEUROSURGERY | Facility: CLINIC | Age: 69
End: 2024-07-09

## 2024-07-31 ENCOUNTER — APPOINTMENT (OUTPATIENT)
Dept: NEUROLOGY | Facility: CLINIC | Age: 69
End: 2024-07-31
Payer: MEDICARE

## 2024-07-31 DIAGNOSIS — M51.16 INTERVERTEBRAL DISC DISORDERS WITH RADICULOPATHY, LUMBAR REGION: ICD-10-CM

## 2024-07-31 PROCEDURE — 95886 MUSC TEST DONE W/N TEST COMP: CPT

## 2024-07-31 PROCEDURE — 95910 NRV CNDJ TEST 7-8 STUDIES: CPT

## 2024-08-01 NOTE — PROCEDURE
[FreeTextEntry3] : EMG/NCS Performed  Please see scanned document in the Neurology section for full report.

## 2024-08-09 ENCOUNTER — APPOINTMENT (OUTPATIENT)
Dept: NEUROSURGERY | Facility: CLINIC | Age: 69
End: 2024-08-09

## 2024-08-09 ENCOUNTER — RESULT REVIEW (OUTPATIENT)
Age: 69
End: 2024-08-09

## 2024-08-09 PROBLEM — M47.27 LUMBOSACRAL SPONDYLOSIS WITH RADICULOPATHY: Status: ACTIVE | Noted: 2024-08-09

## 2024-08-09 PROCEDURE — 99215 OFFICE O/P EST HI 40 MIN: CPT

## 2024-08-09 NOTE — DATA REVIEWED
[de-identified] : Exam Date:      03/02/24 Exam:         XR L S SPINE AP LAT Order#:       [id number]    CLINICAL HISTORY: Lumbar disc disease   Lumbosacral spine (comparison 12/28/2023   Frontal, lateral and cone-down projections demonstrate satisfactory alignment of the bony structures. The patient is status post hardware fusion at the L5-S1 level. A spacer is present within the L5-S1 disc space. No fracture or vertebral compression can be appreciated. There is no evidence of pedicle destruction. There is very mild disc space narrowing at the L1-2 and L2-3 levels. 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 The sacroiliac joints are symmetric.   IMPRESSION: Status post L5-S1 fusion. No significant interval change. ______________________________________________________________________________________ 						 Exam Date: 	  12/28/23					 Exam: 	XR L S SPINE AP LAT					 Order#:	[id number]					                CLINICAL INDICATION: follow-up lumbar spine surgery  EXAM: Upright AP lateral coned-down lateral lumbosacral spine from 12/28/2023 at 0930. Compared to previous day chest fluoroscopic spot images.   IMPRESSION: Stable intact previously seen L5-S1 level posterior spinal fusion hardware consisting of screws with interconnecting rods and metallic interbody disc spacer implant at respective disc level.  Vertebral body and disc space heights and alignment maintained.  Redemonstrated multilevel variable thickness anterior disc margin osteophytes/ligamentous ossifications.  Unremarkable SI joints and pubic symphysis. Partially visualized degenerative rim ossifications along peripheral acetabular articular margins.

## 2024-08-09 NOTE — HISTORY OF PRESENT ILLNESS
[FreeTextEntry1] : Mr. Combs presents s/p L5-S1 transforaminal lumbar interbody fusion on 12/27/23. He has seen podiatry and neurology since last appointment.  He continues to have numbness and tingling with burning in his feet. He has pain with walking from lower back down leg.  He went for EMG with neurology demonstrating peripheral neuropathy and was referred back to pain management for consideration of MADONNA. Dopplers negative.    3/8/24: Mr. Combs presents s/p L5-S1 transforaminal lumbar interbody fusion on 12/27/23. He is doing well post op and his good relief from his back pain and radiculopathy. He has restarted his home Aspirin. He has been participating with PT. He underwent repeat x-rays prior to his appointment today. He was hospitalized this week with petechial rash and saw hematology with lupus anticoagulant panel and other labs pending. He also reports numbness in both feet in the toes for three weeks. Denies shooting radiating pain. The numbness and cold sensation is causing difficulty walking. He has an appointment to see his PCP and podiatrist. Denies tick bites or history of lyme disease.  The patient denies new weakness, bowel/bladder dysfunction, gait disturbance, fever, chills, drainage from incision, redness at incision site.  1/19/24: Mr. Combs presents s/p L5-S1 transforaminal lumbar interbody fusion on 12/27/23 for recurrent lumbar disc herniation with intractable bilateral radiculopathy (right greater than left) and back pain that did not improve with conservative measures.  He is doing well post procedure and has had improvement in his back and radicular pain. He does however have significant muscular discomfort that goes across his low back and up past his inciison. He did not  his prescriptions of methocarbamol or Oxycodone after hospital discharge but has been taking over the counter medications with some relief. His wife has also been applying a TENS unit which is helping him. He underwent L5/S1 microdiscectomy on 11/16/23 with my partner Dr. Dennis, but returned to the emergency room with recurrent and severe symptoms. He reports the numbness in his feet that inhibited his ability to walk is unchanged, however he is ambulating well in the office and has grossly intact sensation in both feet. The patient denies new pain, numbness, tingling, weakness, bowel/bladder dysfunction, gait disturbance, fever, chills, drainage from incision, redness at incision site.  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

## 2024-08-09 NOTE — DATA REVIEWED
[de-identified] : Exam Date:      03/02/24 Exam:         XR L S SPINE AP LAT Order#:       [id number]    CLINICAL HISTORY: Lumbar disc disease   Lumbosacral spine (comparison 12/28/2023   Frontal, lateral and cone-down projections demonstrate satisfactory alignment of the bony structures. The patient is status post hardware fusion at the L5-S1 level. A spacer is present within the L5-S1 disc space. No fracture or vertebral compression can be appreciated. There is no evidence of pedicle destruction. There is very mild disc space narrowing at the L1-2 and L2-3 levels. 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 The sacroiliac joints are symmetric.   IMPRESSION: Status post L5-S1 fusion. No significant interval change. ______________________________________________________________________________________ 						 Exam Date: 	  12/28/23					 Exam: 	XR L S SPINE AP LAT					 Order#:	[id number]					                CLINICAL INDICATION: follow-up lumbar spine surgery  EXAM: Upright AP lateral coned-down lateral lumbosacral spine from 12/28/2023 at 0930. Compared to previous day chest fluoroscopic spot images.   IMPRESSION: Stable intact previously seen L5-S1 level posterior spinal fusion hardware consisting of screws with interconnecting rods and metallic interbody disc spacer implant at respective disc level.  Vertebral body and disc space heights and alignment maintained.  Redemonstrated multilevel variable thickness anterior disc margin osteophytes/ligamentous ossifications.  Unremarkable SI joints and pubic symphysis. Partially visualized degenerative rim ossifications along peripheral acetabular articular margins.

## 2024-08-09 NOTE — ASSESSMENT
[FreeTextEntry1] : Mr. Combs has been doing well since his L5-S1 transforaminal lumbar interbody fusion on 12/27/23.  He is having worsening lower extremity numbness for which a recent EMG demonstrating bilateral lower extremity neuropathy as well as chronic lumbar radiculopathy.  I recommend he obtain an updated MRI of the lumbar spine to ensure that there is no anatomic process to explain the lower extremity numbness that he is having.   He should return to the office in three months time for a progress check. The patient understands the plan of care and is in agreement with it.  I spent 45 minutes relative to this patient encounter.

## 2024-08-12 ENCOUNTER — NON-APPOINTMENT (OUTPATIENT)
Age: 69
End: 2024-08-12

## 2024-08-29 ENCOUNTER — APPOINTMENT (OUTPATIENT)
Dept: PAIN MANAGEMENT | Facility: CLINIC | Age: 69
End: 2024-08-29

## 2024-09-19 ENCOUNTER — NON-APPOINTMENT (OUTPATIENT)
Age: 69
End: 2024-09-19

## 2024-10-19 ENCOUNTER — RESULT REVIEW (OUTPATIENT)
Age: 69
End: 2024-10-19

## 2024-11-15 ENCOUNTER — APPOINTMENT (OUTPATIENT)
Dept: NEUROSURGERY | Facility: CLINIC | Age: 69
End: 2024-11-15
Payer: MEDICARE

## 2024-11-15 VITALS
SYSTOLIC BLOOD PRESSURE: 128 MMHG | BODY MASS INDEX: 24.01 KG/M2 | HEIGHT: 67 IN | HEART RATE: 80 BPM | WEIGHT: 153 LBS | DIASTOLIC BLOOD PRESSURE: 75 MMHG | OXYGEN SATURATION: 99 %

## 2024-11-15 DIAGNOSIS — G62.1 ALCOHOLIC POLYNEUROPATHY: ICD-10-CM

## 2024-11-15 DIAGNOSIS — Z98.1 ARTHRODESIS STATUS: ICD-10-CM

## 2024-11-15 DIAGNOSIS — R26.9 UNSPECIFIED ABNORMALITIES OF GAIT AND MOBILITY: ICD-10-CM

## 2024-11-15 DIAGNOSIS — M47.27 OTHER SPONDYLOSIS WITH RADICULOPATHY, LUMBOSACRAL REGION: ICD-10-CM

## 2024-11-15 PROCEDURE — 99215 OFFICE O/P EST HI 40 MIN: CPT

## 2024-11-23 ENCOUNTER — RESULT REVIEW (OUTPATIENT)
Age: 69
End: 2024-11-23

## 2024-12-11 ENCOUNTER — APPOINTMENT (OUTPATIENT)
Dept: GASTROENTEROLOGY | Facility: CLINIC | Age: 69
End: 2024-12-11
Payer: MEDICARE

## 2024-12-11 ENCOUNTER — APPOINTMENT (OUTPATIENT)
Dept: PAIN MANAGEMENT | Facility: CLINIC | Age: 69
End: 2024-12-11
Payer: MEDICARE

## 2024-12-11 VITALS
DIASTOLIC BLOOD PRESSURE: 73 MMHG | SYSTOLIC BLOOD PRESSURE: 117 MMHG | BODY MASS INDEX: 24.8 KG/M2 | WEIGHT: 158 LBS | HEIGHT: 67 IN

## 2024-12-11 VITALS
OXYGEN SATURATION: 98 % | HEIGHT: 67 IN | DIASTOLIC BLOOD PRESSURE: 74 MMHG | SYSTOLIC BLOOD PRESSURE: 132 MMHG | WEIGHT: 158 LBS | BODY MASS INDEX: 24.8 KG/M2 | HEART RATE: 88 BPM

## 2024-12-11 DIAGNOSIS — M79.2 NEURALGIA AND NEURITIS, UNSPECIFIED: ICD-10-CM

## 2024-12-11 DIAGNOSIS — M54.16 RADICULOPATHY, LUMBAR REGION: ICD-10-CM

## 2024-12-11 DIAGNOSIS — M48.02 SPINAL STENOSIS, CERVICAL REGION: ICD-10-CM

## 2024-12-11 DIAGNOSIS — M79.18 MYALGIA, OTHER SITE: ICD-10-CM

## 2024-12-11 DIAGNOSIS — K21.9 GASTRO-ESOPHAGEAL REFLUX DISEASE W/OUT ESOPHAGITIS: ICD-10-CM

## 2024-12-11 DIAGNOSIS — K70.30 ALCOHOLIC CIRRHOSIS OF LIVER W/OUT ASCITES: ICD-10-CM

## 2024-12-11 DIAGNOSIS — M51.16 INTERVERTEBRAL DISC DISORDERS WITH RADICULOPATHY, LUMBAR REGION: ICD-10-CM

## 2024-12-11 DIAGNOSIS — M96.1 POSTLAMINECTOMY SYNDROME, NOT ELSEWHERE CLASSIFIED: ICD-10-CM

## 2024-12-11 PROCEDURE — 36415 COLL VENOUS BLD VENIPUNCTURE: CPT

## 2024-12-11 PROCEDURE — 99214 OFFICE O/P EST MOD 30 MIN: CPT

## 2024-12-11 PROCEDURE — G2211 COMPLEX E/M VISIT ADD ON: CPT

## 2024-12-11 PROCEDURE — 99215 OFFICE O/P EST HI 40 MIN: CPT

## 2024-12-11 PROCEDURE — 99204 OFFICE O/P NEW MOD 45 MIN: CPT

## 2024-12-12 LAB
ALBUMIN SERPL ELPH-MCNC: 4.7 G/DL
ALP BLD-CCNC: 84 U/L
ALT SERPL-CCNC: 25 U/L
ANION GAP SERPL CALC-SCNC: 14 MMOL/L
AST SERPL-CCNC: 21 U/L
BASOPHILS # BLD AUTO: 0.15 K/UL
BASOPHILS NFR BLD AUTO: 1.3 %
BILIRUB DIRECT SERPL-MCNC: 0.1 MG/DL
BILIRUB INDIRECT SERPL-MCNC: 0.3 MG/DL
BILIRUB SERPL-MCNC: 0.4 MG/DL
BUN SERPL-MCNC: 15 MG/DL
CALCIUM SERPL-MCNC: 10.1 MG/DL
CHLORIDE SERPL-SCNC: 100 MMOL/L
CO2 SERPL-SCNC: 25 MMOL/L
CREAT SERPL-MCNC: 0.88 MG/DL
EGFR: 94 ML/MIN/1.73M2
EOSINOPHIL # BLD AUTO: 0.57 K/UL
EOSINOPHIL NFR BLD AUTO: 5.1 %
GLUCOSE SERPL-MCNC: 89 MG/DL
HCT VFR BLD CALC: 42.9 %
HGB BLD-MCNC: 13.9 G/DL
IMM GRANULOCYTES NFR BLD AUTO: 0.8 %
INR PPP: 1.18 RATIO
LYMPHOCYTES # BLD AUTO: 2.31 K/UL
LYMPHOCYTES NFR BLD AUTO: 20.7 %
MAN DIFF?: NORMAL
MCHC RBC-ENTMCNC: 28.8 PG
MCHC RBC-ENTMCNC: 32.4 G/DL
MCV RBC AUTO: 89 FL
MONOCYTES # BLD AUTO: 0.69 K/UL
MONOCYTES NFR BLD AUTO: 6.2 %
NEUTROPHILS # BLD AUTO: 7.37 K/UL
NEUTROPHILS NFR BLD AUTO: 65.9 %
PLATELET # BLD AUTO: 170 K/UL
POTASSIUM SERPL-SCNC: 4.1 MMOL/L
PROT SERPL-MCNC: 7.9 G/DL
PT BLD: 13.9 SEC
RBC # BLD: 4.82 M/UL
RBC # FLD: 14.4 %
SODIUM SERPL-SCNC: 139 MMOL/L
WBC # FLD AUTO: 11.18 K/UL

## 2024-12-14 LAB
FIBROSIS SCORE: 0.8
FIBROSIS STAGE: NORMAL
FIBROSURE ALPHA 2 MACROGLOBULINS: 373 MG/DL
FIBROSURE ALT (SGPT): 26 IU/L
FIBROSURE APOLIPOPROTEIN A1: 114 MG/DL
FIBROSURE COMMENT 2: NORMAL
FIBROSURE GGT: 107 IU/L
FIBROSURE HAPTOGLOBIN: 102 MG/DL
FIBROSURE INTERPRETATIONS: NORMAL
FIBROSURE LIMITATIONS: NORMAL
FIBROSURE NECROINFLAMM ACTIVITY SCORING: NORMAL
FIBROSURE NECROINFLAMMAT ACTIVITY GRPFIBROSURE NECROINFLAMMA: NORMAL
FIBROSURE NECROINFLAMMAT ACTIVITY SCORE: 0.22
FIBROSURE SCORING: NORMAL
FIBROSURE TOTAL BILIRUBIN: 0.4 MG/DL
Lab: NORMAL

## 2024-12-25 PROBLEM — F10.90 ALCOHOL USE: Status: ACTIVE | Noted: 2023-08-10

## 2025-01-10 ENCOUNTER — APPOINTMENT (OUTPATIENT)
Dept: PAIN MANAGEMENT | Facility: CLINIC | Age: 70
End: 2025-01-10
Payer: MEDICARE

## 2025-01-10 VITALS
SYSTOLIC BLOOD PRESSURE: 126 MMHG | HEART RATE: 80 BPM | OXYGEN SATURATION: 98 % | DIASTOLIC BLOOD PRESSURE: 77 MMHG | BODY MASS INDEX: 26.06 KG/M2 | WEIGHT: 166 LBS | HEIGHT: 67 IN

## 2025-01-10 DIAGNOSIS — M96.1 POSTLAMINECTOMY SYNDROME, NOT ELSEWHERE CLASSIFIED: ICD-10-CM

## 2025-01-10 PROCEDURE — 62323 NJX INTERLAMINAR LMBR/SAC: CPT

## 2025-01-10 RX ADMIN — TRIAMCINOLONE ACETONIDE 0 MG/ML: 80 INJECTION, SUSPENSION INTRA-ARTICULAR; INTRAMUSCULAR at 00:00

## 2025-01-10 RX ADMIN — LIDOCAINE HYDROCHLORIDE %: 10 INJECTION, SOLUTION INFILTRATION; PERINEURAL at 00:00

## 2025-01-10 RX ADMIN — IOHEXOL 0 MG/ML: 180 INJECTION INTRAVENOUS at 00:00

## 2025-01-28 ENCOUNTER — APPOINTMENT (OUTPATIENT)
Dept: NEUROLOGY | Facility: CLINIC | Age: 70
End: 2025-01-28

## 2025-01-29 ENCOUNTER — APPOINTMENT (OUTPATIENT)
Dept: PAIN MANAGEMENT | Facility: CLINIC | Age: 70
End: 2025-01-29
Payer: MEDICARE

## 2025-01-29 VITALS
HEART RATE: 115 BPM | DIASTOLIC BLOOD PRESSURE: 77 MMHG | WEIGHT: 166 LBS | BODY MASS INDEX: 26.06 KG/M2 | HEIGHT: 67 IN | OXYGEN SATURATION: 95 % | SYSTOLIC BLOOD PRESSURE: 123 MMHG

## 2025-01-29 DIAGNOSIS — M79.2 NEURALGIA AND NEURITIS, UNSPECIFIED: ICD-10-CM

## 2025-01-29 DIAGNOSIS — M51.16 INTERVERTEBRAL DISC DISORDERS WITH RADICULOPATHY, LUMBAR REGION: ICD-10-CM

## 2025-01-29 DIAGNOSIS — M96.1 POSTLAMINECTOMY SYNDROME, NOT ELSEWHERE CLASSIFIED: ICD-10-CM

## 2025-01-29 DIAGNOSIS — M48.02 SPINAL STENOSIS, CERVICAL REGION: ICD-10-CM

## 2025-01-29 DIAGNOSIS — M79.18 MYALGIA, OTHER SITE: ICD-10-CM

## 2025-01-29 DIAGNOSIS — M54.16 RADICULOPATHY, LUMBAR REGION: ICD-10-CM

## 2025-01-29 PROCEDURE — 99214 OFFICE O/P EST MOD 30 MIN: CPT

## 2025-01-29 PROCEDURE — G2211 COMPLEX E/M VISIT ADD ON: CPT

## 2025-02-25 ENCOUNTER — APPOINTMENT (OUTPATIENT)
Dept: PAIN MANAGEMENT | Facility: CLINIC | Age: 70
End: 2025-02-25
Payer: MEDICARE

## 2025-02-25 VITALS
OXYGEN SATURATION: 97 % | SYSTOLIC BLOOD PRESSURE: 142 MMHG | HEART RATE: 88 BPM | RESPIRATION RATE: 15 BRPM | DIASTOLIC BLOOD PRESSURE: 96 MMHG

## 2025-02-25 PROCEDURE — 64483 NJX AA&/STRD TFRM EPI L/S 1: CPT | Mod: 50

## 2025-02-25 RX ADMIN — IOHEXOL 0 MG/ML: 180 INJECTION INTRAVENOUS at 00:00

## 2025-02-25 RX ADMIN — LIDOCAINE HYDROCHLORIDE %: 10 INJECTION, SOLUTION INFILTRATION; PERINEURAL at 00:00

## 2025-02-25 RX ADMIN — TRIAMCINOLONE ACETONIDE 0 MG/ML: 80 INJECTION, SUSPENSION INTRA-ARTICULAR; INTRAMUSCULAR at 00:00

## 2025-03-03 ENCOUNTER — APPOINTMENT (OUTPATIENT)
Dept: NEUROSURGERY | Facility: CLINIC | Age: 70
End: 2025-03-03

## 2025-03-03 ENCOUNTER — APPOINTMENT (OUTPATIENT)
Dept: NEUROSURGERY | Facility: CLINIC | Age: 70
End: 2025-03-03
Payer: MEDICARE

## 2025-03-03 DIAGNOSIS — M47.12 OTHER SPONDYLOSIS WITH MYELOPATHY, CERVICAL REGION: ICD-10-CM

## 2025-03-03 DIAGNOSIS — G62.1 ALCOHOLIC POLYNEUROPATHY: ICD-10-CM

## 2025-03-03 DIAGNOSIS — M47.27 OTHER SPONDYLOSIS WITH RADICULOPATHY, LUMBOSACRAL REGION: ICD-10-CM

## 2025-03-03 DIAGNOSIS — M47.22 OTHER SPONDYLOSIS WITH MYELOPATHY, CERVICAL REGION: ICD-10-CM

## 2025-03-03 PROCEDURE — 99215 OFFICE O/P EST HI 40 MIN: CPT | Mod: 93

## 2025-03-06 ENCOUNTER — APPOINTMENT (OUTPATIENT)
Dept: PAIN MANAGEMENT | Facility: CLINIC | Age: 70
End: 2025-03-06
Payer: MEDICARE

## 2025-03-06 DIAGNOSIS — M96.1 POSTLAMINECTOMY SYNDROME, NOT ELSEWHERE CLASSIFIED: ICD-10-CM

## 2025-03-06 DIAGNOSIS — M54.16 RADICULOPATHY, LUMBAR REGION: ICD-10-CM

## 2025-03-06 DIAGNOSIS — M51.16 INTERVERTEBRAL DISC DISORDERS WITH RADICULOPATHY, LUMBAR REGION: ICD-10-CM

## 2025-03-06 DIAGNOSIS — M79.2 NEURALGIA AND NEURITIS, UNSPECIFIED: ICD-10-CM

## 2025-03-06 DIAGNOSIS — M48.02 SPINAL STENOSIS, CERVICAL REGION: ICD-10-CM

## 2025-03-06 DIAGNOSIS — M79.18 MYALGIA, OTHER SITE: ICD-10-CM

## 2025-03-06 PROCEDURE — G2211 COMPLEX E/M VISIT ADD ON: CPT | Mod: 2W

## 2025-03-06 PROCEDURE — 99214 OFFICE O/P EST MOD 30 MIN: CPT | Mod: 2W

## 2025-04-07 ENCOUNTER — NON-APPOINTMENT (OUTPATIENT)
Age: 70
End: 2025-04-07

## 2025-04-09 ENCOUNTER — RX RENEWAL (OUTPATIENT)
Age: 70
End: 2025-04-09

## 2025-04-23 ENCOUNTER — NON-APPOINTMENT (OUTPATIENT)
Age: 70
End: 2025-04-23

## 2025-04-23 ENCOUNTER — RESULT REVIEW (OUTPATIENT)
Age: 70
End: 2025-04-23

## 2025-04-23 ENCOUNTER — APPOINTMENT (OUTPATIENT)
Dept: NEUROSURGERY | Facility: CLINIC | Age: 70
End: 2025-04-23
Payer: MEDICARE

## 2025-04-23 VITALS
HEIGHT: 67 IN | DIASTOLIC BLOOD PRESSURE: 70 MMHG | HEART RATE: 93 BPM | BODY MASS INDEX: 26.84 KG/M2 | SYSTOLIC BLOOD PRESSURE: 120 MMHG | OXYGEN SATURATION: 98 % | WEIGHT: 171 LBS

## 2025-04-23 DIAGNOSIS — M47.12 OTHER SPONDYLOSIS WITH MYELOPATHY, CERVICAL REGION: ICD-10-CM

## 2025-04-23 DIAGNOSIS — R26.9 UNSPECIFIED ABNORMALITIES OF GAIT AND MOBILITY: ICD-10-CM

## 2025-04-23 DIAGNOSIS — M47.22 OTHER SPONDYLOSIS WITH MYELOPATHY, CERVICAL REGION: ICD-10-CM

## 2025-04-23 PROCEDURE — 99215 OFFICE O/P EST HI 40 MIN: CPT

## 2025-04-29 ENCOUNTER — APPOINTMENT (OUTPATIENT)
Dept: PAIN MANAGEMENT | Facility: CLINIC | Age: 70
End: 2025-04-29
Payer: MEDICARE

## 2025-04-29 VITALS
WEIGHT: 176.8 LBS | BODY MASS INDEX: 27.75 KG/M2 | RESPIRATION RATE: 16 BRPM | SYSTOLIC BLOOD PRESSURE: 114 MMHG | OXYGEN SATURATION: 99 % | DIASTOLIC BLOOD PRESSURE: 73 MMHG | HEART RATE: 90 BPM | TEMPERATURE: 98.2 F | HEIGHT: 67 IN

## 2025-04-29 DIAGNOSIS — M96.1 POSTLAMINECTOMY SYNDROME, NOT ELSEWHERE CLASSIFIED: ICD-10-CM

## 2025-04-29 DIAGNOSIS — M54.16 RADICULOPATHY, LUMBAR REGION: ICD-10-CM

## 2025-04-29 DIAGNOSIS — M51.16 INTERVERTEBRAL DISC DISORDERS WITH RADICULOPATHY, LUMBAR REGION: ICD-10-CM

## 2025-04-29 DIAGNOSIS — M48.02 SPINAL STENOSIS, CERVICAL REGION: ICD-10-CM

## 2025-04-29 DIAGNOSIS — M79.2 NEURALGIA AND NEURITIS, UNSPECIFIED: ICD-10-CM

## 2025-04-29 DIAGNOSIS — M79.18 MYALGIA, OTHER SITE: ICD-10-CM

## 2025-04-29 PROCEDURE — G2211 COMPLEX E/M VISIT ADD ON: CPT

## 2025-04-29 PROCEDURE — 99214 OFFICE O/P EST MOD 30 MIN: CPT

## 2025-05-12 ENCOUNTER — RESULT REVIEW (OUTPATIENT)
Age: 70
End: 2025-05-12

## 2025-05-16 ENCOUNTER — APPOINTMENT (OUTPATIENT)
Dept: PAIN MANAGEMENT | Facility: CLINIC | Age: 70
End: 2025-05-16
Payer: MEDICARE

## 2025-05-16 VITALS
DIASTOLIC BLOOD PRESSURE: 80 MMHG | HEART RATE: 93 BPM | OXYGEN SATURATION: 98 % | RESPIRATION RATE: 16 BRPM | SYSTOLIC BLOOD PRESSURE: 144 MMHG

## 2025-05-16 DIAGNOSIS — M96.1 POSTLAMINECTOMY SYNDROME, NOT ELSEWHERE CLASSIFIED: ICD-10-CM

## 2025-05-16 PROCEDURE — 64483 NJX AA&/STRD TFRM EPI L/S 1: CPT | Mod: 50

## 2025-05-16 RX ADMIN — TRIAMCINOLONE ACETONIDE 0 MG/ML: 40 INJECTION, SUSPENSION INTRA-ARTICULAR; INTRAMUSCULAR at 00:00

## 2025-05-19 ENCOUNTER — APPOINTMENT (OUTPATIENT)
Dept: NEUROLOGY | Facility: CLINIC | Age: 70
End: 2025-05-19
Payer: MEDICARE

## 2025-05-19 VITALS
SYSTOLIC BLOOD PRESSURE: 135 MMHG | OXYGEN SATURATION: 96 % | DIASTOLIC BLOOD PRESSURE: 78 MMHG | HEART RATE: 63 BPM | WEIGHT: 178 LBS | BODY MASS INDEX: 27.88 KG/M2

## 2025-05-19 DIAGNOSIS — M62.838 OTHER MUSCLE SPASM: ICD-10-CM

## 2025-05-19 DIAGNOSIS — G62.1 ALCOHOLIC POLYNEUROPATHY: ICD-10-CM

## 2025-05-19 PROCEDURE — 99214 OFFICE O/P EST MOD 30 MIN: CPT

## 2025-05-20 PROBLEM — M62.838 MUSCLE SPASMS OF BOTH LOWER EXTREMITIES: Status: ACTIVE | Noted: 2025-05-20

## 2025-05-20 RX ORDER — BACLOFEN 10 MG/1
10 TABLET ORAL
Qty: 30 | Refills: 2 | Status: ACTIVE | COMMUNITY
Start: 2025-05-20 | End: 1900-01-01

## 2025-05-21 ENCOUNTER — APPOINTMENT (OUTPATIENT)
Dept: NEUROSURGERY | Facility: CLINIC | Age: 70
End: 2025-05-21

## 2025-05-28 ENCOUNTER — RX RENEWAL (OUTPATIENT)
Age: 70
End: 2025-05-28

## 2025-05-29 ENCOUNTER — APPOINTMENT (OUTPATIENT)
Dept: PAIN MANAGEMENT | Facility: CLINIC | Age: 70
End: 2025-05-29

## 2025-05-29 DIAGNOSIS — M51.16 INTERVERTEBRAL DISC DISORDERS WITH RADICULOPATHY, LUMBAR REGION: ICD-10-CM

## 2025-05-29 DIAGNOSIS — M48.02 SPINAL STENOSIS, CERVICAL REGION: ICD-10-CM

## 2025-05-29 DIAGNOSIS — M96.1 POSTLAMINECTOMY SYNDROME, NOT ELSEWHERE CLASSIFIED: ICD-10-CM

## 2025-05-29 DIAGNOSIS — M54.16 RADICULOPATHY, LUMBAR REGION: ICD-10-CM

## 2025-05-29 DIAGNOSIS — M79.18 MYALGIA, OTHER SITE: ICD-10-CM

## 2025-05-29 DIAGNOSIS — M79.2 NEURALGIA AND NEURITIS, UNSPECIFIED: ICD-10-CM

## 2025-05-29 PROCEDURE — 99214 OFFICE O/P EST MOD 30 MIN: CPT | Mod: 2W

## 2025-05-29 PROCEDURE — G2211 COMPLEX E/M VISIT ADD ON: CPT | Mod: 2W

## 2025-06-11 ENCOUNTER — APPOINTMENT (OUTPATIENT)
Dept: GASTROENTEROLOGY | Facility: CLINIC | Age: 70
End: 2025-06-11
Payer: MEDICARE

## 2025-06-11 VITALS
SYSTOLIC BLOOD PRESSURE: 124 MMHG | HEART RATE: 76 BPM | OXYGEN SATURATION: 98 % | DIASTOLIC BLOOD PRESSURE: 72 MMHG | BODY MASS INDEX: 27.78 KG/M2 | WEIGHT: 177 LBS | HEIGHT: 67 IN

## 2025-06-11 PROCEDURE — G2211 COMPLEX E/M VISIT ADD ON: CPT

## 2025-06-11 PROCEDURE — 36415 COLL VENOUS BLD VENIPUNCTURE: CPT

## 2025-06-11 PROCEDURE — 99214 OFFICE O/P EST MOD 30 MIN: CPT

## 2025-06-12 LAB
AFP-TM SERPL-MCNC: 1.8 NG/ML
ALBUMIN SERPL ELPH-MCNC: 4.6 G/DL
ALP BLD-CCNC: 63 U/L
ALT SERPL-CCNC: 42 U/L
ANION GAP SERPL CALC-SCNC: 14 MMOL/L
AST SERPL-CCNC: 31 U/L
BASOPHILS # BLD AUTO: 0.08 K/UL
BASOPHILS NFR BLD AUTO: 0.9 %
BILIRUB DIRECT SERPL-MCNC: 0.18 MG/DL
BILIRUB INDIRECT SERPL-MCNC: 0.3 MG/DL
BILIRUB SERPL-MCNC: 0.4 MG/DL
BUN SERPL-MCNC: 16 MG/DL
CALCIUM SERPL-MCNC: 9.6 MG/DL
CHLORIDE SERPL-SCNC: 102 MMOL/L
CO2 SERPL-SCNC: 25 MMOL/L
CREAT SERPL-MCNC: 0.85 MG/DL
EGFRCR SERPLBLD CKD-EPI 2021: 95 ML/MIN/1.73M2
EOSINOPHIL # BLD AUTO: 0.24 K/UL
EOSINOPHIL NFR BLD AUTO: 2.8 %
GLUCOSE SERPL-MCNC: 94 MG/DL
HCT VFR BLD CALC: 42.7 %
HGB BLD-MCNC: 13.9 G/DL
IMM GRANULOCYTES NFR BLD AUTO: 0.8 %
INR PPP: 1.12 RATIO
LYMPHOCYTES # BLD AUTO: 1.55 K/UL
LYMPHOCYTES NFR BLD AUTO: 18.2 %
MAN DIFF?: NORMAL
MCHC RBC-ENTMCNC: 29.4 PG
MCHC RBC-ENTMCNC: 32.6 G/DL
MCV RBC AUTO: 90.3 FL
MONOCYTES # BLD AUTO: 0.49 K/UL
MONOCYTES NFR BLD AUTO: 5.7 %
NEUTROPHILS # BLD AUTO: 6.1 K/UL
NEUTROPHILS NFR BLD AUTO: 71.6 %
PLATELET # BLD AUTO: 146 K/UL
POTASSIUM SERPL-SCNC: 4.3 MMOL/L
PROT SERPL-MCNC: 7.2 G/DL
PT BLD: 13.2 SEC
RBC # BLD: 4.73 M/UL
RBC # FLD: 13.4 %
SODIUM SERPL-SCNC: 140 MMOL/L
WBC # FLD AUTO: 8.53 K/UL

## 2025-06-14 LAB
FIBROSIS SCORE: 0.72
FIBROSIS STAGE: NORMAL
FIBROSURE ALPHA 2 MACROGLOBULINS: 380 MG/DL
FIBROSURE ALT (SGPT): 35 IU/L
FIBROSURE APOLIPOPROTEIN A1: 129 MG/DL
FIBROSURE COMMENT 2: NORMAL
FIBROSURE GGT: 109 IU/L
FIBROSURE HAPTOGLOBIN: 119 MG/DL
FIBROSURE INTERPRETATIONS: NORMAL
FIBROSURE LIMITATIONS: NORMAL
FIBROSURE NECROINFLAMM ACTIVITY SCORING: NORMAL
FIBROSURE NECROINFLAMMAT ACTIVITY GRPFIBROSURE NECROINFLAMMA: NORMAL
FIBROSURE NECROINFLAMMAT ACTIVITY SCORE: 0.3
FIBROSURE SCORING: NORMAL
FIBROSURE TOTAL BILIRUBIN: 0.3 MG/DL
Lab: NORMAL

## 2025-06-24 ENCOUNTER — RESULT REVIEW (OUTPATIENT)
Age: 70
End: 2025-06-24

## 2025-08-28 ENCOUNTER — APPOINTMENT (OUTPATIENT)
Dept: PAIN MANAGEMENT | Facility: CLINIC | Age: 70
End: 2025-08-28

## 2025-08-28 VITALS
DIASTOLIC BLOOD PRESSURE: 62 MMHG | WEIGHT: 177 LBS | BODY MASS INDEX: 27.78 KG/M2 | SYSTOLIC BLOOD PRESSURE: 100 MMHG | HEIGHT: 67 IN

## 2025-08-28 DIAGNOSIS — M79.2 NEURALGIA AND NEURITIS, UNSPECIFIED: ICD-10-CM

## 2025-08-28 DIAGNOSIS — M48.02 SPINAL STENOSIS, CERVICAL REGION: ICD-10-CM

## 2025-08-28 DIAGNOSIS — M51.16 INTERVERTEBRAL DISC DISORDERS WITH RADICULOPATHY, LUMBAR REGION: ICD-10-CM

## 2025-08-28 DIAGNOSIS — M79.18 MYALGIA, OTHER SITE: ICD-10-CM

## 2025-08-28 DIAGNOSIS — M54.16 RADICULOPATHY, LUMBAR REGION: ICD-10-CM

## 2025-08-28 DIAGNOSIS — M96.1 POSTLAMINECTOMY SYNDROME, NOT ELSEWHERE CLASSIFIED: ICD-10-CM

## 2025-08-28 PROCEDURE — G2211 COMPLEX E/M VISIT ADD ON: CPT

## 2025-08-28 PROCEDURE — 99214 OFFICE O/P EST MOD 30 MIN: CPT

## 2025-09-11 ENCOUNTER — APPOINTMENT (OUTPATIENT)
Dept: PAIN MANAGEMENT | Facility: CLINIC | Age: 70
End: 2025-09-11
Payer: MEDICARE

## 2025-09-11 VITALS
OXYGEN SATURATION: 98 % | DIASTOLIC BLOOD PRESSURE: 77 MMHG | SYSTOLIC BLOOD PRESSURE: 131 MMHG | HEART RATE: 79 BPM | RESPIRATION RATE: 16 BRPM

## 2025-09-11 DIAGNOSIS — M96.1 POSTLAMINECTOMY SYNDROME, NOT ELSEWHERE CLASSIFIED: ICD-10-CM

## 2025-09-11 PROCEDURE — 64483 NJX AA&/STRD TFRM EPI L/S 1: CPT | Mod: 50

## 2025-09-11 RX ADMIN — METHYLPREDNISOLONE ACETATE 0 MG/ML: 40 INJECTION, SUSPENSION INTRA-ARTICULAR; INTRALESIONAL; INTRAMUSCULAR; SOFT TISSUE at 00:00

## 2025-09-11 RX ADMIN — IOHEXOL 0 MG/ML: 180 INJECTION INTRAVENOUS at 00:00

## 2025-09-11 RX ADMIN — LIDOCAINE HYDROCHLORIDE %: 10 INJECTION, SOLUTION INFILTRATION; PERINEURAL at 00:00

## 2025-09-19 ENCOUNTER — NON-APPOINTMENT (OUTPATIENT)
Age: 70
End: 2025-09-19